# Patient Record
Sex: FEMALE | Race: WHITE | NOT HISPANIC OR LATINO
[De-identification: names, ages, dates, MRNs, and addresses within clinical notes are randomized per-mention and may not be internally consistent; named-entity substitution may affect disease eponyms.]

---

## 2017-01-17 ENCOUNTER — APPOINTMENT (OUTPATIENT)
Dept: WOUND CARE | Facility: CLINIC | Age: 82
End: 2017-01-17

## 2017-02-23 ENCOUNTER — APPOINTMENT (OUTPATIENT)
Dept: WOUND CARE | Facility: CLINIC | Age: 82
End: 2017-02-23

## 2017-02-23 VITALS
TEMPERATURE: 97.1 F | HEIGHT: 61 IN | DIASTOLIC BLOOD PRESSURE: 76 MMHG | BODY MASS INDEX: 18.5 KG/M2 | HEART RATE: 86 BPM | RESPIRATION RATE: 16 BRPM | SYSTOLIC BLOOD PRESSURE: 110 MMHG | WEIGHT: 98 LBS

## 2017-02-23 DIAGNOSIS — M79.89 OTHER SPECIFIED SOFT TISSUE DISORDERS: ICD-10-CM

## 2017-03-28 ENCOUNTER — APPOINTMENT (OUTPATIENT)
Dept: WOUND CARE | Facility: CLINIC | Age: 82
End: 2017-03-28

## 2017-05-04 ENCOUNTER — APPOINTMENT (OUTPATIENT)
Dept: WOUND CARE | Facility: CLINIC | Age: 82
End: 2017-05-04

## 2017-08-24 ENCOUNTER — APPOINTMENT (OUTPATIENT)
Dept: WOUND CARE | Facility: CLINIC | Age: 82
End: 2017-08-24
Payer: MEDICARE

## 2017-08-24 VITALS
BODY MASS INDEX: 18.5 KG/M2 | DIASTOLIC BLOOD PRESSURE: 71 MMHG | TEMPERATURE: 97 F | SYSTOLIC BLOOD PRESSURE: 129 MMHG | HEIGHT: 61 IN | RESPIRATION RATE: 16 BRPM | WEIGHT: 98 LBS | HEART RATE: 114 BPM

## 2017-08-24 DIAGNOSIS — Z86.79 PERSONAL HISTORY OF OTHER DISEASES OF THE CIRCULATORY SYSTEM: ICD-10-CM

## 2017-08-24 DIAGNOSIS — I83.019 VARICOSE VEINS OF RIGHT LOWER EXTREMITY WITH ULCER OF UNSPECIFIED SITE: ICD-10-CM

## 2017-08-24 DIAGNOSIS — Z87.891 PERSONAL HISTORY OF NICOTINE DEPENDENCE: ICD-10-CM

## 2017-08-24 DIAGNOSIS — I48.91 UNSPECIFIED ATRIAL FIBRILLATION: ICD-10-CM

## 2017-08-24 DIAGNOSIS — C50.919 MALIGNANT NEOPLASM OF UNSPECIFIED SITE OF UNSPECIFIED FEMALE BREAST: ICD-10-CM

## 2017-08-24 DIAGNOSIS — Z85.3 PERSONAL HISTORY OF MALIGNANT NEOPLASM OF BREAST: ICD-10-CM

## 2017-08-24 PROCEDURE — 99213 OFFICE O/P EST LOW 20 MIN: CPT

## 2017-08-30 ENCOUNTER — EMERGENCY (EMERGENCY)
Facility: HOSPITAL | Age: 82
LOS: 0 days | Discharge: HOME | End: 2017-08-30
Attending: EMERGENCY MEDICINE
Payer: MEDICARE

## 2017-08-30 VITALS
DIASTOLIC BLOOD PRESSURE: 96 MMHG | TEMPERATURE: 98 F | HEIGHT: 63 IN | RESPIRATION RATE: 17 BRPM | HEART RATE: 103 BPM | SYSTOLIC BLOOD PRESSURE: 151 MMHG

## 2017-08-30 PROCEDURE — 99284 EMERGENCY DEPT VISIT MOD MDM: CPT

## 2017-08-30 RX ORDER — MIDAZOLAM HYDROCHLORIDE 1 MG/ML
2 INJECTION, SOLUTION INTRAMUSCULAR; INTRAVENOUS ONCE
Qty: 0 | Refills: 0 | Status: DISCONTINUED | OUTPATIENT
Start: 2017-08-30 | End: 2017-08-30

## 2017-08-30 RX ADMIN — MIDAZOLAM HYDROCHLORIDE 2 MILLIGRAM(S): 1 INJECTION, SOLUTION INTRAMUSCULAR; INTRAVENOUS at 19:01

## 2017-08-30 NOTE — ED PROVIDER NOTE - PHYSICAL EXAMINATION
Vitals: tachy at 103, otherwise WNL  Gen: AAOx2, acutely agitated, albeit NAD due to pain, non-toxic  Head: ncat, perrla, eomi b/l  Neck: supple, no lymphadenopathy, no midline deviation  Heart: rrr, no m/r/g  Lungs: CTA b/l, no rales/ronchi/wheezes  Abd: soft, nontender, non-distended, no rebound or guarding  Ext: no clubbing/cyanosis/edema  Neuro: sensation and muscle strength intact b/l, moving all 4 ext fighting with staff, great strength in all ext.

## 2017-08-30 NOTE — ED PROVIDER NOTE - OBJECTIVE STATEMENT
86 yo F with AMS, found wandering in [place].  Pt. couldn't give her home address.  Pt. has information on her showing home address and contact information, but number is incorrect, connecting to local high school. Pt. cannot provide adequate history, mildly agitated in ER, requesting to leave.   ROS: negative for fever, cough, headache, chest pain, shortness of breath, abd pain, nausea, vomiting, diarrhea, rash, paresthesia, and weakness.   PMH: chemo treatment on 8/14 for metastatic ovarian Ca, hx of Atrial fib and not on anticoagulation, R breast Ca 20 years ago and treated with a Lumpectomy; Meds: Denies; SH: Denies smoking/drinking/drug use

## 2017-08-30 NOTE — ED PROVIDER NOTE - PROGRESS NOTE DETAILS
Mojgan Torre, Daughter called  [phone] callback  pt. has worsening dementia over last two years, pt. left home stubbornly instead of waiting for ride to store ([employer]), pt. got lost in the process  daughter thinks chemo might be related to worsening mental status, daughter is on her way to ED now I had a length discussion with patient's two daughters at bedside.    They say that patient's mental status decline has been slow and progressive over the last few years.  Up until now pt. has been living by herself, but she seems to be loosing the ability to care for herself.  Family realize this.  They don't believe test and CT scan will benefit her as she has stage IV cancer and they have come to the reality that her decline is expected.  They want to take her home and have her f/u with her pcp, and possible get a home health aid.  They state that pt. is at baseline mental status--stubborn, aggressive at times, demented.  They understand to risk of possible worsening decline, especially if pt. has mets to her brain for example that might be causing acute intracranial bleed.  they understand pt. can decompensate, and agree to call 911 in that event.  Pt. has no complaints at this time and prefers to go home with family.  will discharge into care of daughters.

## 2017-08-30 NOTE — ED PROVIDER NOTE - MEDICAL DECISION MAKING DETAILS
88 yo F with AMS, cannot tell baseline, cannot contact family at this time, although multiple attempts on number listed on emergency contact card  -w/u for AMS, labs including, salicylate, tylenol levels, drug screen ua, cx, trop, ct brain, ekg, cxr, continuous obs  -likely SW consult for placement once cleared, unless family calls back

## 2017-08-30 NOTE — ED ADULT TRIAGE NOTE - CHIEF COMPLAINT QUOTE
as per ems pt confused, could not find her way home, wandering in [place] since this am. bystander called 911. unknown medical history. pt alert confused at triage. as per ems pt confused, could not find her way home, wandering in [place] since this am. bystander called 911. history of ovarian cancer. pt alert confused at triage.

## 2017-08-30 NOTE — ED ADULT NURSE NOTE - CHIEF COMPLAINT QUOTE
as per ems pt confused, could not find her way home, wandering in [place] since this am. bystander called 911. history of ovarian cancer. pt alert confused at triage.

## 2017-08-30 NOTE — ED ADULT NURSE NOTE - OBJECTIVE STATEMENT
received pt awake alert  and very combative,  pt found wondering the streets and was brought  in by EMS, pt uncooperative and refusing lads Md Pack aware

## 2017-08-30 NOTE — ED ADULT NURSE REASSESSMENT NOTE - REASSESS COMMUNICATION
pt daughter at bed side pt refuse blood work and  v/s   Md Pack aware  1:1 observation at  bedside continued to monitor

## 2017-08-31 DIAGNOSIS — R41.82 ALTERED MENTAL STATUS, UNSPECIFIED: ICD-10-CM

## 2017-08-31 DIAGNOSIS — Z85.3 PERSONAL HISTORY OF MALIGNANT NEOPLASM OF BREAST: ICD-10-CM

## 2017-08-31 DIAGNOSIS — Z79.82 LONG TERM (CURRENT) USE OF ASPIRIN: ICD-10-CM

## 2017-08-31 DIAGNOSIS — C56.9 MALIGNANT NEOPLASM OF UNSPECIFIED OVARY: ICD-10-CM

## 2017-08-31 DIAGNOSIS — I48.91 UNSPECIFIED ATRIAL FIBRILLATION: ICD-10-CM

## 2018-01-10 ENCOUNTER — APPOINTMENT (OUTPATIENT)
Dept: GERIATRICS | Facility: CLINIC | Age: 83
End: 2018-01-10
Payer: MEDICARE

## 2018-01-10 VITALS — SYSTOLIC BLOOD PRESSURE: 94 MMHG | HEART RATE: 90 BPM | DIASTOLIC BLOOD PRESSURE: 58 MMHG

## 2018-01-10 DIAGNOSIS — R18.8 OTHER ASCITES: ICD-10-CM

## 2018-01-10 DIAGNOSIS — E43 UNSPECIFIED SEVERE PROTEIN-CALORIE MALNUTRITION: ICD-10-CM

## 2018-01-10 DIAGNOSIS — F03.90 UNSPECIFIED DEMENTIA W/OUT BEHAVIORAL DISTURBANCE: ICD-10-CM

## 2018-01-10 DIAGNOSIS — R41.3 OTHER AMNESIA: ICD-10-CM

## 2018-01-10 DIAGNOSIS — C56.9 MALIGNANT NEOPLASM OF UNSPECIFIED OVARY: ICD-10-CM

## 2018-01-10 DIAGNOSIS — Z91.81 HISTORY OF FALLING: ICD-10-CM

## 2018-01-10 DIAGNOSIS — Z92.21 PERSONAL HISTORY OF ANTINEOPLASTIC CHEMOTHERAPY: ICD-10-CM

## 2018-01-10 DIAGNOSIS — F03.91 UNSPECIFIED DEMENTIA WITH BEHAVIORAL DISTURBANCE: ICD-10-CM

## 2018-01-10 PROCEDURE — 99205 OFFICE O/P NEW HI 60 MIN: CPT | Mod: GC

## 2018-01-11 LAB
25(OH)D3 SERPL-MCNC: 48.1 NG/ML
ALBUMIN SERPL ELPH-MCNC: 3.2 G/DL
ALP BLD-CCNC: 93 U/L
ALT SERPL-CCNC: 10 U/L
ANION GAP SERPL CALC-SCNC: 15 MMOL/L
AST SERPL-CCNC: 24 U/L
BASOPHILS # BLD AUTO: 0.03 K/UL
BASOPHILS NFR BLD AUTO: 0.4 %
BILIRUB SERPL-MCNC: 0.3 MG/DL
BUN SERPL-MCNC: 16 MG/DL
CALCIUM SERPL-MCNC: 9.3 MG/DL
CHLORIDE SERPL-SCNC: 101 MMOL/L
CO2 SERPL-SCNC: 26 MMOL/L
CREAT SERPL-MCNC: 0.76 MG/DL
EOSINOPHIL # BLD AUTO: 0.15 K/UL
EOSINOPHIL NFR BLD AUTO: 1.9 %
FOLATE SERPL-MCNC: 13.8 NG/ML
GLUCOSE SERPL-MCNC: 93 MG/DL
HCT VFR BLD CALC: 40.5 %
HGB BLD-MCNC: 12 G/DL
IMM GRANULOCYTES NFR BLD AUTO: 0.3 %
LYMPHOCYTES # BLD AUTO: 1.72 K/UL
LYMPHOCYTES NFR BLD AUTO: 22.3 %
MAN DIFF?: NORMAL
MCHC RBC-ENTMCNC: 29.6 GM/DL
MCHC RBC-ENTMCNC: 30.9 PG
MCV RBC AUTO: 104.4 FL
MONOCYTES # BLD AUTO: 0.81 K/UL
MONOCYTES NFR BLD AUTO: 10.5 %
NEUTROPHILS # BLD AUTO: 4.98 K/UL
NEUTROPHILS NFR BLD AUTO: 64.6 %
PLATELET # BLD AUTO: 210 K/UL
POTASSIUM SERPL-SCNC: 4.1 MMOL/L
PROT SERPL-MCNC: 7.8 G/DL
RBC # BLD: 3.88 M/UL
RBC # FLD: 16.2 %
SODIUM SERPL-SCNC: 142 MMOL/L
TSH SERPL-ACNC: 4.35 UIU/ML
VIT B12 SERPL-MCNC: 557 PG/ML
WBC # FLD AUTO: 7.71 K/UL

## 2019-07-26 NOTE — ED ADULT NURSE NOTE - CCCP TRG CHIEF CMPLNT
----- Message from Lissett Doran MD sent at 7/26/2019  3:18 PM CDT -----  Please notify the patient of the following results.  TB test normal. Please provide documentation to patient   altered mental status